# Patient Record
Sex: FEMALE | Race: WHITE
[De-identification: names, ages, dates, MRNs, and addresses within clinical notes are randomized per-mention and may not be internally consistent; named-entity substitution may affect disease eponyms.]

---

## 2019-10-04 ENCOUNTER — HOSPITAL ENCOUNTER (OUTPATIENT)
Dept: HOSPITAL 62 - RAD | Age: 64
End: 2019-10-04
Attending: INTERNAL MEDICINE
Payer: COMMERCIAL

## 2019-10-04 DIAGNOSIS — M77.32: Primary | ICD-10-CM

## 2019-10-04 DIAGNOSIS — M20.12: ICD-10-CM

## 2019-10-04 DIAGNOSIS — M79.672: ICD-10-CM

## 2019-10-04 NOTE — RADIOLOGY REPORT (SQ)
EXAM DESCRIPTION:  FOOT LEFT COMPLETE



COMPLETED DATE/TIME:  10/4/2019 7:49 pm



REASON FOR STUDY:  PAIN-MASS DORSUM



COMPARISON:  None.



NUMBER OF VIEWS:  Three views.



TECHNIQUE:  AP, lateral and oblique  radiographic images acquired of the left foot.



LIMITATIONS:  None.



FINDINGS:  MINERALIZATION: Normal.

BONES: No acute fracture or malalignment.  Hallux valgus deformity with degenerative changes at the 1
st metatarsophalangeal joint.

JOINTS: Marginal erosions and soft tissue swelling at the 1st MTP joint which could be from gout.

SOFT TISSUES: Mild forefoot soft tissue swelling.  No foreign body.

OTHER: Small plantar calcaneal spur.



IMPRESSION:  Hallux valgus deformity.  Soft tissue swelling at the 1st MTP joint with some marginal e
rosions along the 1st metatarsal head, question superimposed gout



TECHNICAL DOCUMENTATION:  JOB ID:  [id number]

 2011 [email or web address]- All Rights Reserved



Reading location - IP/workstation name: [email or web address]